# Patient Record
(demographics unavailable — no encounter records)

---

## 2025-02-20 NOTE — END OF VISIT
[FreeTextEntry3] : I, Dr. Bedoya personally performed the evaluation and management (E/M) services including all necessary procedures, for this new patient. That E/M includes conducting the clinically appropriate initial history &/or exam, assessing all conditions, and establishing the plan of care. Today, my STEPHEN, Corine Montelongo, was here to observe &/or participate in the visit & follow plan of care established by me.

## 2025-02-20 NOTE — PHYSICAL EXAM
[Midline] : trachea located in midline position [Normal] : no rashes [de-identified] : cerumen removed from the bilateral ear canals with curette, once removed, normal ear canals

## 2025-02-20 NOTE — HISTORY OF PRESENT ILLNESS
[de-identified] : Patient come sin for an ear cleaning. No pain in the ears, no issues with ringing in the ears or dizziness. She does not have any complaints about nasal congestion or runny nose. She does not have any issues with changes in hearing

## 2025-02-20 NOTE — ASSESSMENT
[FreeTextEntry1] : Patient without any nasal issues comes in totally clogged diminished hearing massive cerumen impaction bilaterally curetted out follow-up as needed.